# Patient Record
Sex: MALE | Race: WHITE | NOT HISPANIC OR LATINO | URBAN - METROPOLITAN AREA
[De-identification: names, ages, dates, MRNs, and addresses within clinical notes are randomized per-mention and may not be internally consistent; named-entity substitution may affect disease eponyms.]

---

## 2017-07-25 VITALS
SYSTOLIC BLOOD PRESSURE: 125 MMHG | OXYGEN SATURATION: 100 % | SYSTOLIC BLOOD PRESSURE: 94 MMHG | HEART RATE: 62 BPM | DIASTOLIC BLOOD PRESSURE: 63 MMHG | WEIGHT: 142 LBS | DIASTOLIC BLOOD PRESSURE: 87 MMHG | RESPIRATION RATE: 18 BRPM | SYSTOLIC BLOOD PRESSURE: 101 MMHG | OXYGEN SATURATION: 97 % | RESPIRATION RATE: 15 BRPM | SYSTOLIC BLOOD PRESSURE: 127 MMHG | DIASTOLIC BLOOD PRESSURE: 65 MMHG | RESPIRATION RATE: 12 BRPM | HEART RATE: 69 BPM | OXYGEN SATURATION: 96 % | HEART RATE: 61 BPM | RESPIRATION RATE: 17 BRPM | DIASTOLIC BLOOD PRESSURE: 59 MMHG | SYSTOLIC BLOOD PRESSURE: 138 MMHG | HEART RATE: 67 BPM | DIASTOLIC BLOOD PRESSURE: 69 MMHG | SYSTOLIC BLOOD PRESSURE: 104 MMHG | HEART RATE: 57 BPM | OXYGEN SATURATION: 98 % | TEMPERATURE: 97 F | DIASTOLIC BLOOD PRESSURE: 58 MMHG | RESPIRATION RATE: 16 BRPM | DIASTOLIC BLOOD PRESSURE: 53 MMHG | SYSTOLIC BLOOD PRESSURE: 122 MMHG | OXYGEN SATURATION: 94 % | DIASTOLIC BLOOD PRESSURE: 77 MMHG | OXYGEN SATURATION: 99 % | HEART RATE: 59 BPM | RESPIRATION RATE: 10 BRPM | SYSTOLIC BLOOD PRESSURE: 86 MMHG | DIASTOLIC BLOOD PRESSURE: 81 MMHG | SYSTOLIC BLOOD PRESSURE: 105 MMHG | HEART RATE: 42 BPM | SYSTOLIC BLOOD PRESSURE: 97 MMHG | RESPIRATION RATE: 22 BRPM | DIASTOLIC BLOOD PRESSURE: 67 MMHG | TEMPERATURE: 96.9 F | HEIGHT: 70 IN | HEART RATE: 25 BPM | OXYGEN SATURATION: 93 %

## 2017-07-26 ENCOUNTER — OFFICE (AMBULATORY)
Dept: URBAN - METROPOLITAN AREA CLINIC 64 | Facility: CLINIC | Age: 47
End: 2017-07-26

## 2017-07-26 ENCOUNTER — AMBULATORY SURGICAL CENTER (AMBULATORY)
Dept: URBAN - METROPOLITAN AREA SURGERY 17 | Facility: SURGERY | Age: 47
End: 2017-07-26
Payer: COMMERCIAL

## 2017-07-26 DIAGNOSIS — D12.2 BENIGN NEOPLASM OF ASCENDING COLON: ICD-10-CM

## 2017-07-26 DIAGNOSIS — K64.8 OTHER HEMORRHOIDS: ICD-10-CM

## 2017-07-26 DIAGNOSIS — K57.30 DIVERTICULOSIS OF LARGE INTESTINE WITHOUT PERFORATION OR ABS: ICD-10-CM

## 2017-07-26 DIAGNOSIS — Z80.0 FAMILY HISTORY OF MALIGNANT NEOPLASM OF DIGESTIVE ORGANS: ICD-10-CM

## 2017-07-26 PROBLEM — Z12.11 SCREENING FOR COLONIC NEOPLASIA: Status: ACTIVE | Noted: 2017-07-26

## 2017-07-26 LAB
GI HISTOLOGY: A. UNSPECIFIED: (no result)
GI HISTOLOGY: PDF REPORT: (no result)

## 2017-07-26 PROCEDURE — 88305 TISSUE EXAM BY PATHOLOGIST: CPT | Performed by: INTERNAL MEDICINE

## 2017-07-26 PROCEDURE — 45380 COLONOSCOPY AND BIOPSY: CPT | Mod: 33 | Performed by: INTERNAL MEDICINE

## 2017-07-26 RX ADMIN — PROPOFOL 100 MG: 10 INJECTION, EMULSION INTRAVENOUS at 10:20

## 2017-07-26 RX ADMIN — PROPOFOL 50 MG: 10 INJECTION, EMULSION INTRAVENOUS at 10:31

## 2017-07-26 RX ADMIN — PROPOFOL 50 MG: 10 INJECTION, EMULSION INTRAVENOUS at 10:22

## 2017-07-26 RX ADMIN — PROPOFOL 50 MG: 10 INJECTION, EMULSION INTRAVENOUS at 10:24

## 2017-07-26 RX ADMIN — PROPOFOL 50 MG: 10 INJECTION, EMULSION INTRAVENOUS at 10:29

## 2017-07-26 RX ADMIN — LIDOCAINE HYDROCHLORIDE 25 MG: 10 INJECTION, SOLUTION EPIDURAL; INFILTRATION; INTRACAUDAL; PERINEURAL at 10:16

## 2017-07-26 RX ADMIN — PROPOFOL 50 MG: 10 INJECTION, EMULSION INTRAVENOUS at 10:27

## 2017-07-26 RX ADMIN — PROPOFOL 50 MG: 10 INJECTION, EMULSION INTRAVENOUS at 10:26

## 2022-10-20 ENCOUNTER — HOSPITAL ENCOUNTER (EMERGENCY)
Facility: HOSPITAL | Age: 52
Discharge: HOME | End: 2022-10-20
Attending: EMERGENCY MEDICINE | Admitting: EMERGENCY MEDICINE

## 2022-10-20 VITALS
HEART RATE: 70 BPM | OXYGEN SATURATION: 100 % | DIASTOLIC BLOOD PRESSURE: 78 MMHG | TEMPERATURE: 96.5 F | SYSTOLIC BLOOD PRESSURE: 129 MMHG | RESPIRATION RATE: 16 BRPM

## 2022-10-20 DIAGNOSIS — M79.602 LEFT ARM PAIN: Primary | ICD-10-CM

## 2022-10-20 DIAGNOSIS — E78.5 HYPERLIPIDEMIA, UNSPECIFIED HYPERLIPIDEMIA TYPE: ICD-10-CM

## 2022-10-20 PROCEDURE — 99282 EMERGENCY DEPT VISIT SF MDM: CPT

## 2022-10-21 NOTE — DISCHARGE INSTRUCTIONS
Home, rest, alternate ice and heat to affected area, keep arm elevated, Tylenol as needed for pain.  Home medicine as prescribed, follow up with PCP for recheck. Return to care with further concerns.

## 2022-10-21 NOTE — ED PROVIDER NOTES
EMERGENCY DEPARTMENT ENCOUNTER    Room Number:  B01/01  Date of encounter:  10/20/2022  PCP: Enma Nguyen MD  Historian: Patient      HPI:  Chief Complaint: Left arm pain  A complete HPI/ROS/PMH/PSH/SH/FH are unobtainable due to: N/A    Context: Андрей Hernandez is a 52 y.o. RHD male with past medical history of hyperlipidemia who presents to the ED c/o left arm pain.  Patient states that he had an outpatient CT scan with IV contrast that extravasated in the left arm.  Patient is complaining of pain and swelling in the arm and was concerned about it.  Patient states that he has been applying heat and elevating the arm.  Denies any chest pain, shortness of breath, fever, chills, numbness, tingling or weakness of the arm.      PAST MEDICAL HISTORY  Active Ambulatory Problems     Diagnosis Date Noted   • No Active Ambulatory Problems     Resolved Ambulatory Problems     Diagnosis Date Noted   • No Resolved Ambulatory Problems     No Additional Past Medical History         PAST SURGICAL HISTORY  No past surgical history on file.      FAMILY HISTORY  No family history on file.      SOCIAL HISTORY  Social History     Socioeconomic History   • Marital status:          ALLERGIES  Sulfa antibiotics        REVIEW OF SYSTEMS  Review of Systems     All systems reviewed and negative except for those discussed in HPI.       PHYSICAL EXAM    I have reviewed the triage vital signs and nursing notes.    ED Triage Vitals [10/20/22 2021]   Temp Heart Rate Resp BP SpO2   96.5 °F (35.8 °C) 74 16 -- 99 %      Temp src Heart Rate Source Patient Position BP Location FiO2 (%)   -- Monitor -- -- --       Physical Exam  GENERAL: Alert and oriented x3, not distressed  HENT: nares patent  EYES: no scleral icterus  CV: regular rhythm, regular rate, intact radial pulse LUE  RESPIRATORY: normal effort, clear to auscultate bilaterally  MUSCULOSKELETAL: no deformity, normal ROM, mild soft tissue tenderness to the left upper extremity,  compartments soft  NEURO: alert, moves all extremities, normal sensation, follows commands  SKIN: warm, dry, mild swelling of the left arm, bruising at the left AC from prior IV, no palpable cords        LAB RESULTS  No results found for this or any previous visit (from the past 24 hour(s)).    Ordered the above labs and independently reviewed the results.        RADIOLOGY  No Radiology Exams Resulted Within Past 24 Hours    I ordered the above noted radiological studies. Reviewed by me and discussed with radiologist.  See dictation for official radiology interpretation.      PROCEDURES    Procedures      MEDICATIONS GIVEN IN ER    Medications - No data to display      PROGRESS, DATA ANALYSIS, CONSULTS, AND MEDICAL DECISION MAKING    All labs have been independently reviewed by me.  All radiology studies have been reviewed by me and discussed with radiologist dictating the report.   EKG's independently viewed and interpreted by me.  Discussion below represents my analysis of pertinent findings related to patient's condition, differential diagnosis, treatment plan and final disposition.    DDx: Includes but not limited to IV contrast extravasation, left arm pain, DVT, SVT         MDM: Patient's compartments are soft and has normal range of motion and sensation is left upper extremity, has been advised to apply ice and heat alternating, keep the arm elevated and take NSAIDs as needed for pain.    PPE: The patient wore a surgical mask throughout the entire patient encounter. I wore an N95.    AS OF 21:51 EDT VITALS:    BP - 129/78  HR - 70  TEMP - 96.5 °F (35.8 °C)  O2 SATS - 100%        DIAGNOSIS  Final diagnoses:   Left arm pain   Hyperlipidemia, unspecified hyperlipidemia type         DISPOSITION  DISCHARGE    Patient discharged in stable condition.    Reviewed implications of results, diagnosis, meds, responsibility to follow up, warning signs and symptoms of possible worsening, potential complications and reasons  to return to ER.    Patient/Family voiced understanding of above instructions.    Discussed plan for discharge, as there is no emergent indication for admission. Patient referred to primary care provider for BP management due to today's BP. Pt/family is agreeable and understands need for follow up and repeat testing.  Pt is aware that discharge does not mean that nothing is wrong but it indicates no emergency is present that requires admission and they must continue care with follow-up as given below or physician of their choice.     FOLLOW-UP  Enma Nguyen MD  [address] [address]  [phone]    Schedule an appointment as soon as possible for a visit            Medication List      No changes were made to your prescriptions during this visit.           Note Disclaimer: At [facility], we believe that sharing information builds trust and better relationships. You are receiving this note because you recently visited [facility]. It is possible you will see health information before a provider has talked with you about it. This kind of information can be easy to misunderstand. To help you fully understand what it means for your health, we urge you to discuss this note with your provider.       Jairo Spicer PA  10/20/22 6914

## 2022-10-21 NOTE — ED TRIAGE NOTES
pt arrived via PV c/o LUE swelling . Pt had CT w/ contrast done at outside facility around 1430 today. Pt reports his IV infiltrated and Contrast got into his skin. LUE is swollen and skin is tight . Pt denies pain . No redness is noted except for site where IV was placed earlier today

## 2022-10-21 NOTE — ED PROVIDER NOTES
Pt presents to the ED c/o  left arm pain and swelling after he had a CT scan with IV contrast this afternoon with some extravasation.  Has been using ice and heat but had some residual pressure in the antecubital area.  Denies any numbness or weakness or tingling currently, no cold sensation,     On exam,   General: No acute distress, nontoxic  HEENT: EOMI  Pulm: Symmetric chest rise, nonlabored breathing  CV: Regular rate and rhythm  GI: Nondistended  MSK: No deformity, minimal edema in the antecubital area without cellulitis, full range of motion, neurovascular intact distally with soft compartments  Skin: Warm, dry  Neuro: Awake, alert, oriented x 4, moving all extremities, no focal deficits  Psych: Calm, cooperative    Vital signs and nursing notes reviewed.         N95, protective eye goggles, and gloves used during this encounter. Patient in surgical mask.      Plan: Supportive care measures discussed, ED return for worsening symptoms discussed.  Outpatient follow-up as needed.  All questions and concerns addressed.  Safe for discharge.       Attestation:  The DARYN and I have discussed this patient's history, physical exam, and treatment plan.  I have reviewed the documentation and personally had a face to face interaction with the patient. I affirm the documentation and agree with the treatment and plan.  The attached note describes my personal findings.            Alex Brooks MD  10/20/22 0039

## 2022-10-29 ENCOUNTER — HOSPITAL ENCOUNTER (EMERGENCY)
Facility: HOSPITAL | Age: 52
Discharge: HOME | End: 2022-10-29
Attending: EMERGENCY MEDICINE | Admitting: EMERGENCY MEDICINE

## 2022-10-29 VITALS
OXYGEN SATURATION: 99 % | RESPIRATION RATE: 16 BRPM | DIASTOLIC BLOOD PRESSURE: 80 MMHG | SYSTOLIC BLOOD PRESSURE: 126 MMHG | HEART RATE: 64 BPM | TEMPERATURE: 97.3 F

## 2022-10-29 DIAGNOSIS — R21 RASH IN ADULT: Primary | ICD-10-CM

## 2022-10-29 PROCEDURE — 63710000001 PREDNISONE PER 1 MG: Performed by: EMERGENCY MEDICINE

## 2022-10-29 PROCEDURE — 99283 EMERGENCY DEPT VISIT LOW MDM: CPT

## 2022-10-29 RX ORDER — PREDNISONE 20 MG/1
20 TABLET ORAL DAILY
Qty: 3 TABLET | Refills: 0 | Status: SHIPPED | OUTPATIENT
Start: 2022-10-29 | End: 2022-11-01

## 2022-10-29 RX ORDER — PREDNISONE 20 MG/1
60 TABLET ORAL ONCE
Status: COMPLETED | OUTPATIENT
Start: 2022-10-29 | End: 2022-10-29

## 2022-10-29 RX ADMIN — PREDNISONE 60 MG: 20 TABLET ORAL at 17:15

## 2022-11-14 VITALS
RESPIRATION RATE: 15 BRPM | RESPIRATION RATE: 14 BRPM | RESPIRATION RATE: 1 BRPM | OXYGEN SATURATION: 99 % | OXYGEN SATURATION: 98 % | SYSTOLIC BLOOD PRESSURE: 100 MMHG | HEART RATE: 72 BPM | WEIGHT: 135 LBS | SYSTOLIC BLOOD PRESSURE: 134 MMHG | HEIGHT: 70 IN | DIASTOLIC BLOOD PRESSURE: 54 MMHG | HEART RATE: 77 BPM | HEART RATE: 73 BPM | RESPIRATION RATE: 16 BRPM | DIASTOLIC BLOOD PRESSURE: 86 MMHG | RESPIRATION RATE: 12 BRPM | DIASTOLIC BLOOD PRESSURE: 48 MMHG | SYSTOLIC BLOOD PRESSURE: 136 MMHG | SYSTOLIC BLOOD PRESSURE: 110 MMHG | SYSTOLIC BLOOD PRESSURE: 101 MMHG | HEART RATE: 81 BPM | SYSTOLIC BLOOD PRESSURE: 92 MMHG | RESPIRATION RATE: 18 BRPM | HEART RATE: 83 BPM | OXYGEN SATURATION: 92 % | OXYGEN SATURATION: 94 % | HEART RATE: 71 BPM | RESPIRATION RATE: 10 BRPM | OXYGEN SATURATION: 96 % | DIASTOLIC BLOOD PRESSURE: 44 MMHG | DIASTOLIC BLOOD PRESSURE: 64 MMHG | DIASTOLIC BLOOD PRESSURE: 56 MMHG | DIASTOLIC BLOOD PRESSURE: 33 MMHG | RESPIRATION RATE: 7 BRPM | OXYGEN SATURATION: 100 % | HEART RATE: 70 BPM | SYSTOLIC BLOOD PRESSURE: 94 MMHG | TEMPERATURE: 97.3 F | DIASTOLIC BLOOD PRESSURE: 45 MMHG | SYSTOLIC BLOOD PRESSURE: 85 MMHG | DIASTOLIC BLOOD PRESSURE: 96 MMHG | DIASTOLIC BLOOD PRESSURE: 50 MMHG | SYSTOLIC BLOOD PRESSURE: 95 MMHG | HEART RATE: 75 BPM | HEART RATE: 82 BPM | TEMPERATURE: 96 F | SYSTOLIC BLOOD PRESSURE: 102 MMHG | SYSTOLIC BLOOD PRESSURE: 96 MMHG | RESPIRATION RATE: 8 BRPM | RESPIRATION RATE: 13 BRPM

## 2022-11-22 ENCOUNTER — AMBULATORY SURGICAL CENTER (AMBULATORY)
Dept: URBAN - METROPOLITAN AREA SURGERY 17 | Facility: SURGERY | Age: 52
End: 2022-11-22
Payer: COMMERCIAL

## 2022-11-22 DIAGNOSIS — Z80.0 FAMILY HISTORY OF MALIGNANT NEOPLASM OF DIGESTIVE ORGANS: ICD-10-CM

## 2022-11-22 DIAGNOSIS — K64.8 OTHER HEMORRHOIDS: ICD-10-CM

## 2022-11-22 DIAGNOSIS — Z86.010 PERSONAL HISTORY OF COLONIC POLYPS: ICD-10-CM

## 2022-11-22 PROCEDURE — 45378 DIAGNOSTIC COLONOSCOPY: CPT | Mod: 33 | Performed by: INTERNAL MEDICINE

## 2023-01-09 PROCEDURE — 88184 FLOWCYTOMETRY/ TC 1 MARKER: CPT

## 2023-01-09 PROCEDURE — 88182 CELL MARKER STUDY: CPT

## 2023-01-09 PROCEDURE — 88185 FLOWCYTOMETRY/TC ADD-ON: CPT

## 2023-01-10 ENCOUNTER — PRIOR AUTHORIZATION (OUTPATIENT)
Dept: ONCOLOGY | Facility: CLINIC | Age: 53
End: 2023-01-10
Payer: COMMERCIAL

## 2023-01-10 ENCOUNTER — LAB (OUTPATIENT)
Dept: LAB | Facility: HOSPITAL | Age: 53
End: 2023-01-10
Payer: COMMERCIAL

## 2023-01-10 ENCOUNTER — CONSULT (OUTPATIENT)
Dept: ONCOLOGY | Facility: CLINIC | Age: 53
End: 2023-01-10
Payer: COMMERCIAL

## 2023-01-10 VITALS
SYSTOLIC BLOOD PRESSURE: 131 MMHG | OXYGEN SATURATION: 98 % | WEIGHT: 142.8 LBS | DIASTOLIC BLOOD PRESSURE: 88 MMHG | HEART RATE: 74 BPM | RESPIRATION RATE: 16 BRPM | BODY MASS INDEX: 19.99 KG/M2 | HEIGHT: 71 IN | TEMPERATURE: 98 F

## 2023-01-10 DIAGNOSIS — D75.1 ERYTHROCYTOSIS: ICD-10-CM

## 2023-01-10 DIAGNOSIS — I82.890 SUPERFICIAL VEIN THROMBOSIS: Primary | ICD-10-CM

## 2023-01-10 DIAGNOSIS — D72.818 OTHER DECREASED WHITE BLOOD CELL (WBC) COUNT: ICD-10-CM

## 2023-01-10 DIAGNOSIS — D58.2 ELEVATED HEMOGLOBIN: Primary | ICD-10-CM

## 2023-01-10 PROBLEM — D72.819 LEUKOPENIA: Status: ACTIVE | Noted: 2023-01-10

## 2023-01-10 LAB
ALBUMIN SERPL-MCNC: 4.8 G/DL (ref 3.5–5.2)
ALBUMIN/GLOB SERPL: 1.8 G/DL (ref 1.1–2.4)
ALP SERPL-CCNC: 67 U/L (ref 38–116)
ALT SERPL W P-5'-P-CCNC: 42 U/L (ref 0–41)
ANION GAP SERPL CALCULATED.3IONS-SCNC: 10.5 MMOL/L (ref 5–15)
AST SERPL-CCNC: 33 U/L (ref 0–40)
BASOPHILS # BLD AUTO: 0.04 10*3/MM3 (ref 0–0.2)
BASOPHILS NFR BLD AUTO: 0.8 % (ref 0–1.5)
BILIRUB SERPL-MCNC: 0.4 MG/DL (ref 0.2–1.2)
BUN SERPL-MCNC: 20 MG/DL (ref 6–20)
BUN/CREAT SERPL: 28.2 (ref 7.3–30)
CALCIUM SPEC-SCNC: 9.9 MG/DL (ref 8.5–10.2)
CHLORIDE SERPL-SCNC: 102 MMOL/L (ref 98–107)
CO2 SERPL-SCNC: 26.5 MMOL/L (ref 22–29)
CREAT SERPL-MCNC: 0.71 MG/DL (ref 0.7–1.3)
DEPRECATED RDW RBC AUTO: 39.8 FL (ref 37–54)
EGFRCR SERPLBLD CKD-EPI 2021: 109.7 ML/MIN/1.73
EOSINOPHIL # BLD AUTO: 0.08 10*3/MM3 (ref 0–0.4)
EOSINOPHIL NFR BLD AUTO: 1.7 % (ref 0.3–6.2)
ERYTHROCYTE [DISTWIDTH] IN BLOOD BY AUTOMATED COUNT: 11.9 % (ref 12.3–15.4)
FOLATE SERPL-MCNC: >20 NG/ML (ref 4.78–24.2)
GLOBULIN UR ELPH-MCNC: 2.6 GM/DL (ref 1.8–3.5)
GLUCOSE SERPL-MCNC: 102 MG/DL (ref 74–124)
HCT VFR BLD AUTO: 46.3 % (ref 37.5–51)
HGB BLD-MCNC: 16.5 G/DL (ref 13–17.7)
IMM GRANULOCYTES # BLD AUTO: 0.08 10*3/MM3 (ref 0–0.05)
IMM GRANULOCYTES NFR BLD AUTO: 1.7 % (ref 0–0.5)
LYMPHOCYTES # BLD AUTO: 1.49 10*3/MM3 (ref 0.7–3.1)
LYMPHOCYTES NFR BLD AUTO: 30.8 % (ref 19.6–45.3)
MCH RBC QN AUTO: 32.5 PG (ref 26.6–33)
MCHC RBC AUTO-ENTMCNC: 35.6 G/DL (ref 31.5–35.7)
MCV RBC AUTO: 91.3 FL (ref 79–97)
MONOCYTES # BLD AUTO: 0.51 10*3/MM3 (ref 0.1–0.9)
MONOCYTES NFR BLD AUTO: 10.5 % (ref 5–12)
NEUTROPHILS NFR BLD AUTO: 2.64 10*3/MM3 (ref 1.7–7)
NEUTROPHILS NFR BLD AUTO: 54.5 % (ref 42.7–76)
NRBC BLD AUTO-RTO: 0.6 /100 WBC (ref 0–0.2)
PLATELET # BLD AUTO: 169 10*3/MM3 (ref 140–450)
PMV BLD AUTO: 10.8 FL (ref 6–12)
POTASSIUM SERPL-SCNC: 4.5 MMOL/L (ref 3.5–4.7)
PROT SERPL-MCNC: 7.4 G/DL (ref 6.3–8)
RBC # BLD AUTO: 5.07 10*6/MM3 (ref 4.14–5.8)
SODIUM SERPL-SCNC: 139 MMOL/L (ref 134–145)
VIT B12 BLD-MCNC: 1192 PG/ML (ref 211–946)
WBC NRBC COR # BLD: 4.84 10*3/MM3 (ref 3.4–10.8)

## 2023-01-10 PROCEDURE — 80053 COMPREHEN METABOLIC PANEL: CPT | Performed by: INTERNAL MEDICINE

## 2023-01-10 PROCEDURE — 85025 COMPLETE CBC W/AUTO DIFF WBC: CPT

## 2023-01-10 PROCEDURE — 82746 ASSAY OF FOLIC ACID SERUM: CPT | Performed by: INTERNAL MEDICINE

## 2023-01-10 PROCEDURE — 36415 COLL VENOUS BLD VENIPUNCTURE: CPT

## 2023-01-10 PROCEDURE — 82607 VITAMIN B-12: CPT | Performed by: INTERNAL MEDICINE

## 2023-01-10 PROCEDURE — 99204 OFFICE O/P NEW MOD 45 MIN: CPT | Performed by: INTERNAL MEDICINE

## 2023-01-10 RX ORDER — CLOTRIMAZOLE AND BETAMETHASONE DIPROPIONATE 10; .64 MG/G; MG/G
CREAM TOPICAL
COMMUNITY
Start: 2022-12-28

## 2023-01-10 RX ORDER — ROSUVASTATIN CALCIUM 10 MG/1
10 TABLET, COATED ORAL DAILY
COMMUNITY

## 2023-01-10 RX ORDER — MOMETASONE FUROATE 50 UG/1
2 SPRAY, METERED NASAL DAILY
COMMUNITY

## 2023-01-10 RX ORDER — EPINEPHRINE 0.3 MG/.3ML
INJECTION SUBCUTANEOUS
COMMUNITY
Start: 2022-05-09

## 2023-01-10 RX ORDER — FEXOFENADINE HCL 180 MG/1
180 TABLET ORAL DAILY
COMMUNITY

## 2023-01-10 NOTE — PROGRESS NOTES
Chief Complaint  Chronic borderline leukopenia, superficial abdominal wall thrombosis, borderline erythrocytosis    Subjective    Patient was referred by Dr. Enma Nguyen for hematology consultation regarding the above issues    History of Present Illness    The patient is a 52-year-old gentleman with past medical history significant for hyperlipidemia, hyperglycemia, colon polyps here for initial hematology consultation regarding the above issues.    Patient has history of colonic polyps, last underwent colonoscopy in December 2022 with Dr. Aureliano Li, reports negative findings.  He does have a family history of colon polyps, no family history of colon cancer.    Patient notes that around September 2022 he had been lifting weights and noticed development of a palpable venous cord extending from the area of his left nipple down to around his umbilicus.  This was nontender.  He was seen by vascular surgery and underwent multiple imaging studies.  Ultrasound of the abdomen with hepatic Dopplers on 10/8/2022 was negative, showed hepatic vasculature patent.  CT abdomen pelvis 10/20/2022 at [facility] was negative, could not visualize any abnormality where the palpable left abdominal wall cord was present.  A CT angiogram of the abdomen and pelvis on 11/16/2022 performed at [facility] was negative.  Ultimately it was felt by vascular surgery that the patient had a thrombosed superficial vein in the abdominal wall.  This gradually improved over time.  He has not received any anticoagulation.  Patient was seen by his primary care physician Dr. Nguyen on 1/3/2023 and orders have been placed for laboratory studies with antiphospholipid panel, factor V Leiden mutation, prothrombin gene mutation, Antithrombin III, protein C activity, protein S activity.  Labs are to be drawn this week.  Patient notes currently that the palpable cord is only vaguely identifiable at this point.  He has had no other personal nor  any family history of thrombosis.    In reviewing the patient's labs, he has had WBC that has been in the 3-5000 range.  Labs date back to 11/15/2018 at which time he had a WBC of 5.7 with differential 55 segs, 32 lymphs, 10 monocytes.  WBC on 2/18/2020 was 4.7, on 9/15/2021 had declined to 3.7 (again with normal differential).  On 5/6/2022, WBC was 3.4 with normal differential.  Most recent labs on 12/29/2022 with WBC 3.5 and differential 47 segs, 40 lymphs, 9 monocytes, 2 eosinophils.    The patient has maintained a hematocrit in the 46-48 range.  On 12/29/2022, he did have a hemoglobin of 17.3 (above normal range) and hematocrit normal at 49.5.    Today, the patient reports that he has done relatively well recently.  He does have some mild pain involving his left shoulder with certain movements, feels that he likely has injured his rotator cuff.        Objective   Vital Signs:   /88   Pulse 74   Temp 98 °F (36.7 °C) (Temporal)   Resp 16   Ht 181 cm (71.26\") Comment: NEW HT  Wt 64.8 kg (142 lb 12.8 oz)   SpO2 98%   BMI 19.77 kg/m²     Physical Exam  Constitutional:       Appearance: He is well-developed.   Eyes:      Conjunctiva/sclera: Conjunctivae normal.   Neck:      Thyroid: No thyromegaly.   Cardiovascular:      Rate and Rhythm: Normal rate and regular rhythm.      Heart sounds: No murmur heard.    No friction rub. No gallop.   Pulmonary:      Effort: No respiratory distress.      Breath sounds: Normal breath sounds.   Abdominal:      General: Bowel sounds are normal. There is no distension.      Palpations: Abdomen is soft.      Tenderness: There is no abdominal tenderness.      Comments: There is a vague \"cord\" in the subcutaneous tissues just beneath the left pectoral/breast area extending into the upper abdomen with no visible abnormality.  Diameter is less than 0.5 cm.   Lymphadenopathy:      Head:      Right side of head: No submandibular adenopathy.      Cervical: No cervical adenopathy.       Upper Body:      Right upper body: No supraclavicular adenopathy.      Left upper body: No supraclavicular adenopathy.   Skin:     General: Skin is warm and dry.      Findings: No rash.   Neurological:      Mental Status: He is alert and oriented to person, place, and time.      Cranial Nerves: No cranial nerve deficit.      Motor: No abnormal muscle tone.      Deep Tendon Reflexes: Reflexes normal.   Psychiatric:         Behavior: Behavior normal.        Result Review : CBC from today.  Reviewed multiple outside laboratory and radiographic records including progress notes as outlined above and below.       Assessment and Plan     *Superficial abdominal wall venous thrombosis  · Patient with no prior personal nor family history of thrombosis  · In September 2022 patient noticed development of a palpable venous cord extending from the area of his left nipple down to around his umbilicus, nontender.    · He was seen by vascular surgery and underwent multiple imaging studies.    · Ultrasound of the abdomen with hepatic Dopplers on 10/8/2022 was negative, showed hepatic vasculature patent.    · CT abdomen pelvis 10/20/2022 at [facility] was negative, could not visualize any abnormality where the palpable left abdominal wall cord was present.    · CT angiogram of the abdomen and pelvis on 11/16/2022 performed at [facility] was negative.    · Ultimately it was felt by vascular surgery that the patient had a thrombosed superficial vein in the abdominal wall.    · This gradually improved over time.  He has not received any anticoagulation.    · Patient was seen by his primary care physician Dr. Nguyen on 1/3/2023 and orders have been placed for laboratory studies with antiphospholipid panel, factor V Leiden mutation, prothrombin gene mutation, Antithrombin III, protein C activity, protein S activity.  Labs are to be drawn this week.    · Today on exam there is a vaguely palpable very small cord in the  subcutaneous tissue just beneath the left pectoral muscle/breast extending into the upper abdomen.  As above patient notes that this has improved over time and is nontender.  Presumably this represents a superficial venous thrombosis in the abdominal wall although it has not been evident on radiographic imaging as noted above.  It appears to be resolving spontaneously and therefore no treatment is needed.  We will await the hypercoagulable evaluation being sent by patient's primary care physician.    *Chronic borderline leukopenia  · Patient has had WBC that has been in the 3-5000 range.    · Labs date back to 11/15/2018 at which time he had a WBC of 5.7 with differential 55 segs, 32 lymphs, 10 monocytes.    · WBC on 2/18/2020 was 4.7, on 9/15/2021 had declined to 3.7 (again with normal differential).    · On 5/6/2022, WBC was 3.4 with normal differential.    · Most recent labs on 12/29/2022 with WBC 3.5 and differential 47 segs, 40 lymphs, 9 monocytes, 2 eosinophils.  · WBC today is 4.84 with normal differential.  · We will send off evaluation today with B12, folate, peripheral blood flow cytometry, CORRY panel.  Unclear whether this represents a chronic benign leukopenia or autoimmune leukopenia.  Recheck when patient returns in 2 months.    *Borderline erythrocytosis  · The patient has maintained a hematocrit in the 46-48 range.    · On 12/29/2022, he did have a hemoglobin of 17.3 (above normal range) and hematocrit normal at 49.5.  · Today, patient with normal hemoglobin 16.5 and hematocrit of 46.3.  · In any other situation I would not necessarily pursue any evaluation for this given the normal findings today.  With the borderline elevated findings on 12/29/2022 and with recent unusual site of thrombosis with abdominal wall superficial venous thrombus, we will go ahead and send off JAK2 V617F mutation with reflex to exon 12 and we will go ahead and send off the EPO level.  He has no symptoms to suggest underlying  obstructive sleep apnea.  Recheck hematocrit in 2 months with return visit.    Plan:  1. Additional labs today with CMP, peripheral blood flow cytometry, CORRY panel, B12, folate, erythropoietin level, JAK2 V617F mutation with reflex to exon 12 mutation.  2. The patient is having labs drawn by Dr. Nguyen this week for hypercoagulable evaluation with antiphospholipid panel, factor V Leiden mutation, prothrombin gene mutation, Antithrombin III, protein C activity, protein S activity  3. There is no need for consideration of anticoagulation given the superficial, asymptomatic, and improving nature of the abdominal wall thrombosis  4. Return visit in 2 months with CBC      I did spend 45 minutes in total time caring for the patient today, time spent in review of records, face-to-face consultation, coordination of care, placement of orders, completion of documentation.

## 2023-01-10 NOTE — LETTER
January 10, 2023     Enma Nguyen MD  [address]  [address]  [place] [id number]    Patient: Андрей Hernandez   YOB: 1970   Date of Visit: 1/10/2023       Dear Dr. Patrick MD:    Thank you for referring Андрей Hernandez to me for evaluation. Below are the relevant portions of my assessment and plan of care.    If you have questions, please do not hesitate to call me. I look forward to following Андрей along with you.         Sincerely,        Jario Guerra MD        CC: No Recipients  Jairo Guerra Jr., MD  01/10/23 2136  Sign when Signing Visit  Chief Complaint  Chronic borderline leukopenia, superficial abdominal wall thrombosis, borderline erythrocytosis    Subjective     Patient was referred by Dr. Enma Nguyen for hematology consultation regarding the above issues    History of Present Illness    The patient is a 52-year-old gentleman with past medical history significant for hyperlipidemia, hyperglycemia, colon polyps here for initial hematology consultation regarding the above issues.    Patient has history of colonic polyps, last underwent colonoscopy in December 2022 with Dr. Aureliano Li, reports negative findings.  He does have a family history of colon polyps, no family history of colon cancer.    Patient notes that around September 2022 he had been lifting weights and noticed development of a palpable venous cord extending from the area of his left nipple down to around his umbilicus.  This was nontender.  He was seen by vascular surgery and underwent multiple imaging studies.  Ultrasound of the abdomen with hepatic Dopplers on 10/8/2022 was negative, showed hepatic vasculature patent.  CT abdomen pelvis 10/20/2022 at [facility] was negative, could not visualize any abnormality where the palpable left abdominal wall cord was present.  A CT angiogram of the abdomen and pelvis on 11/16/2022 performed at [facility] was negative.  Ultimately it was felt by vascular surgery  that the patient had a thrombosed superficial vein in the abdominal wall.  This gradually improved over time.  He has not received any anticoagulation.  Patient was seen by his primary care physician Dr. Nguyen on 1/3/2023 and orders have been placed for laboratory studies with antiphospholipid panel, factor V Leiden mutation, prothrombin gene mutation, Antithrombin III, protein C activity, protein S activity.  Labs are to be drawn this week.  Patient notes currently that the palpable cord is only vaguely identifiable at this point.  He has had no other personal nor any family history of thrombosis.    In reviewing the patient's labs, he has had WBC that has been in the 3-5000 range.  Labs date back to 11/15/2018 at which time he had a WBC of 5.7 with differential 55 segs, 32 lymphs, 10 monocytes.  WBC on 2/18/2020 was 4.7, on 9/15/2021 had declined to 3.7 (again with normal differential).  On 5/6/2022, WBC was 3.4 with normal differential.  Most recent labs on 12/29/2022 with WBC 3.5 and differential 47 segs, 40 lymphs, 9 monocytes, 2 eosinophils.    The patient has maintained a hematocrit in the 46-48 range.  On 12/29/2022, he did have a hemoglobin of 17.3 (above normal range) and hematocrit normal at 49.5.    Today, the patient reports that he has done relatively well recently.  He does have some mild pain involving his left shoulder with certain movements, feels that he likely has injured his rotator cuff.        Objective    Vital Signs:   /88   Pulse 74   Temp 98 °F (36.7 °C) (Temporal)   Resp 16   Ht 181 cm (71.26\") Comment: NEW HT  Wt 64.8 kg (142 lb 12.8 oz)   SpO2 98%   BMI 19.77 kg/m²     Physical Exam  Constitutional:       Appearance: He is well-developed.   Eyes:      Conjunctiva/sclera: Conjunctivae normal.   Neck:      Thyroid: No thyromegaly.   Cardiovascular:      Rate and Rhythm: Normal rate and regular rhythm.      Heart sounds: No murmur heard.    No friction rub. No gallop.    Pulmonary:      Effort: No respiratory distress.      Breath sounds: Normal breath sounds.   Abdominal:      General: Bowel sounds are normal. There is no distension.      Palpations: Abdomen is soft.      Tenderness: There is no abdominal tenderness.      Comments: There is a vague \"cord\" in the subcutaneous tissues just beneath the left pectoral/breast area extending into the upper abdomen with no visible abnormality.  Diameter is less than 0.5 cm.   Lymphadenopathy:      Head:      Right side of head: No submandibular adenopathy.      Cervical: No cervical adenopathy.      Upper Body:      Right upper body: No supraclavicular adenopathy.      Left upper body: No supraclavicular adenopathy.   Skin:     General: Skin is warm and dry.      Findings: No rash.   Neurological:      Mental Status: He is alert and oriented to person, place, and time.      Cranial Nerves: No cranial nerve deficit.      Motor: No abnormal muscle tone.      Deep Tendon Reflexes: Reflexes normal.   Psychiatric:         Behavior: Behavior normal.        Result Review : CBC from today.  Reviewed multiple outside laboratory and radiographic records including progress notes as outlined above and below.      Assessment and Plan     *Superficial abdominal wall venous thrombosis  · Patient with no prior personal nor family history of thrombosis  · In September 2022 patient noticed development of a palpable venous cord extending from the area of his left nipple down to around his umbilicus, nontender.    · He was seen by vascular surgery and underwent multiple imaging studies.    · Ultrasound of the abdomen with hepatic Dopplers on 10/8/2022 was negative, showed hepatic vasculature patent.    · CT abdomen pelvis 10/20/2022 at [facility] imaging was negative, could not visualize any abnormality where the palpable left abdominal wall cord was present.    · CT angiogram of the abdomen and pelvis on 11/16/2022 performed at [facility] was negative.     · Ultimately it was felt by vascular surgery that the patient had a thrombosed superficial vein in the abdominal wall.    · This gradually improved over time.  He has not received any anticoagulation.    · Patient was seen by his primary care physician Dr. Nguyen on 1/3/2023 and orders have been placed for laboratory studies with antiphospholipid panel, factor V Leiden mutation, prothrombin gene mutation, Antithrombin III, protein C activity, protein S activity.  Labs are to be drawn this week.    · Today on exam there is a vaguely palpable very small cord in the subcutaneous tissue just beneath the left pectoral muscle/breast extending into the upper abdomen.  As above patient notes that this has improved over time and is nontender.  Presumably this represents a superficial venous thrombosis in the abdominal wall although it has not been evident on radiographic imaging as noted above.  It appears to be resolving spontaneously and therefore no treatment is needed.  We will await the hypercoagulable evaluation being sent by patient's primary care physician.    *Chronic borderline leukopenia  · Patient has had WBC that has been in the 3-5000 range.    · Labs date back to 11/15/2018 at which time he had a WBC of 5.7 with differential 55 segs, 32 lymphs, 10 monocytes.    · WBC on 2/18/2020 was 4.7, on 9/15/2021 had declined to 3.7 (again with normal differential).    · On 5/6/2022, WBC was 3.4 with normal differential.    · Most recent labs on 12/29/2022 with WBC 3.5 and differential 47 segs, 40 lymphs, 9 monocytes, 2 eosinophils.  · WBC today is 4.84 with normal differential.  · We will send off evaluation today with B12, folate, peripheral blood flow cytometry, CORRY panel.  Unclear whether this represents a chronic benign leukopenia or autoimmune leukopenia.  Recheck when patient returns in 2 months.    *Borderline erythrocytosis  · The patient has maintained a hematocrit in the 46-48 range.    · On 12/29/2022,  he did have a hemoglobin of 17.3 (above normal range) and hematocrit normal at 49.5.  · Today, patient with normal hemoglobin 16.5 and hematocrit of 46.3.  · In any other situation I would not necessarily pursue any evaluation for this given the normal findings today.  With the borderline elevated findings on 12/29/2022 and with recent unusual site of thrombosis with abdominal wall superficial venous thrombus, we will go ahead and send off JAK2 V617F mutation with reflex to exon 12 and we will go ahead and send off the EPO level.  He has no symptoms to suggest underlying obstructive sleep apnea.  Recheck hematocrit in 2 months with return visit.    Plan:  1. Additional labs today with CMP, peripheral blood flow cytometry, CORRY panel, B12, folate, erythropoietin level, JAK2 V617F mutation with reflex to exon 12 mutation.  2. The patient is having labs drawn by Dr. Nguyen this week for hypercoagulable evaluation with antiphospholipid panel, factor V Leiden mutation, prothrombin gene mutation, Antithrombin III, protein C activity, protein S activity  3. There is no need for consideration of anticoagulation given the superficial, asymptomatic, and improving nature of the abdominal wall thrombosis  4. Return visit in 2 months with CBC      I did spend 45 minutes in total time caring for the patient today, time spent in review of records, face-to-face consultation, coordination of care, placement of orders, completion of documentation.

## 2023-01-10 NOTE — TELEPHONE ENCOUNTER
No PA needed for Flow 05809, 53520, 29482 and Jak2 26186, 21684 per [facility]. Ref # [id number]. Ok'd lab to send to [facility].

## 2023-01-11 ENCOUNTER — PATIENT ROUNDING (BHMG ONLY) (OUTPATIENT)
Dept: ONCOLOGY | Facility: CLINIC | Age: 53
End: 2023-01-11
Payer: COMMERCIAL

## 2023-01-11 LAB
CENTROMERE B AB SER-ACNC: <0.2 AI (ref 0–0.9)
CHROMATIN AB SERPL-ACNC: <0.2 AI (ref 0–0.9)
DSDNA AB SER-ACNC: <1 IU/ML (ref 0–9)
ENA JO1 AB SER-ACNC: <0.2 AI (ref 0–0.9)
ENA RNP AB SER-ACNC: 0.5 AI (ref 0–0.9)
ENA SCL70 AB SER-ACNC: <0.2 AI (ref 0–0.9)
ENA SM AB SER-ACNC: <0.2 AI (ref 0–0.9)
ENA SS-A AB SER-ACNC: <0.2 AI (ref 0–0.9)
ENA SS-B AB SER-ACNC: <0.2 AI (ref 0–0.9)
EPO SERPL-ACNC: 12.1 MIU/ML (ref 2.6–18.5)
Lab: NORMAL

## 2023-01-11 NOTE — PROGRESS NOTES
A My-Chart message has been sent to the patient for PATIENT ROUNDING with [facility].

## 2023-01-13 ENCOUNTER — TELEPHONE (OUTPATIENT)
Dept: ONCOLOGY | Facility: CLINIC | Age: 53
End: 2023-01-13
Payer: COMMERCIAL

## 2023-01-13 NOTE — TELEPHONE ENCOUNTER
"Returned call to patient with the information from Dr. Guerra as follows:  \"Please let him know that all of the lab work we have available to date has been negative.  We are still waiting on his Osvaldo 2 mutation which may be available by next week and we will let him know when this results.  For now, no changes to the plan we discussed at his visit.\"  He v/u.    "

## 2023-01-13 NOTE — TELEPHONE ENCOUNTER
Caller: Андрей Hernandez    Relationship: Self    Best call back number: [phone]    What is the best time to reach you: ANYTIME    Who are you requesting to speak with (clinical staff, provider,  specific staff member): DR YOUNGBLOOD OR NURSE    What was the call regarding: PLEASE CALL PT TO DISCUSS HIS 1/10 LAB RESULTS.     Do you require a callback: YES

## 2023-01-20 ENCOUNTER — TELEPHONE (OUTPATIENT)
Dept: ONCOLOGY | Facility: CLINIC | Age: 53
End: 2023-01-20
Payer: COMMERCIAL

## 2023-01-20 NOTE — TELEPHONE ENCOUNTER
Caller: Андрей Hernandez    Relationship: Self    Best call back number: [phone]    What is the best time to reach you: ANYTIME    Who are you requesting to speak with (clinical staff, provider,  specific staff member): DR YOUNGBLOOD OR NURSE    What was the call regarding: PLEASE CALL PT TO DISCUSS HIS 1/10 LAB RESULTS.     Do you require a callback: YES

## 2023-01-20 NOTE — TELEPHONE ENCOUNTER
Returned call to patient with the information that his labs are negative.  The flow cytometry and the Osvaldo 2 were negative.  Read him the other results.  He is to keep his appointment in March as scheduled.

## 2023-01-25 LAB — REF LAB TEST METHOD: NORMAL

## 2023-01-25 NOTE — TELEPHONE ENCOUNTER
Caller: Андрей Hernandez    Relationship: Self    Best call back number: [phone]    What is the best time to reach you: ANYTIME    Who are you requesting to speak with (clinical staff, provider,  specific staff member): DR YOUNGBLOOD OR DACIA HIGGINBOTHAM    What was the call regarding: PT HAD LABS DRAWN AGAIN ON 1/16 WITH [facility]. HE WANTED TO DISCUSS THE RESULTS WITH DR YOUNGBLOOD.     Do you require a callback: YES

## 2023-02-07 LAB — REF LAB TEST METHOD: NORMAL

## 2023-03-10 ENCOUNTER — TELEPHONE (OUTPATIENT)
Dept: ONCOLOGY | Facility: CLINIC | Age: 53
End: 2023-03-10
Payer: COMMERCIAL

## 2023-03-10 NOTE — TELEPHONE ENCOUNTER
Caller: Андрей Hernandez    Relationship: Self    Best call back number: [phone]    What was the call regarding: PATIENT WANTED TO KNOW IF HE CAN GET HIS LABS DONE AT [facility] IN THE SPRING, IF SO CAN YOU SEND ORDERS    Do you require a callback: YES

## 2023-03-13 ENCOUNTER — TELEPHONE (OUTPATIENT)
Dept: ONCOLOGY | Facility: CLINIC | Age: 53
End: 2023-03-13
Payer: COMMERCIAL

## 2023-03-13 NOTE — TELEPHONE ENCOUNTER
Returned call to patient and confirmed that lab orders had been faxed for today's appointment. Patient v/u.

## 2023-03-13 NOTE — TELEPHONE ENCOUNTER
Phoned [email or web address], spoke with Mario at corporate office. Unable to get fax with lab orders to go through to local [facility] location. Mario provided his direct fax number and stated he would email lab orders to local [email or web address] location for patient's lab appointment today.

## 2023-03-14 ENCOUNTER — OFFICE VISIT (OUTPATIENT)
Dept: ONCOLOGY | Facility: CLINIC | Age: 53
End: 2023-03-14
Payer: COMMERCIAL

## 2023-03-14 VITALS
SYSTOLIC BLOOD PRESSURE: 138 MMHG | WEIGHT: 144.7 LBS | TEMPERATURE: 97.3 F | BODY MASS INDEX: 20.26 KG/M2 | DIASTOLIC BLOOD PRESSURE: 88 MMHG | HEART RATE: 82 BPM | OXYGEN SATURATION: 95 % | HEIGHT: 71 IN | RESPIRATION RATE: 16 BRPM

## 2023-03-14 DIAGNOSIS — D75.1 ERYTHROCYTOSIS: Primary | ICD-10-CM

## 2023-03-14 PROCEDURE — 99214 OFFICE O/P EST MOD 30 MIN: CPT | Performed by: INTERNAL MEDICINE

## 2023-03-14 NOTE — PROGRESS NOTES
"Chief Complaint  Chronic borderline leukopenia, superficial abdominal wall thrombosis, borderline erythrocytosis    Subjective        History of Present Illness  Patient returns today in follow-up after his initial consultation with laboratory studies to review.  He has not experienced any recurrence of the abdominal wall superficial thrombosis.  He did undergo hypercoagulable evaluation drawn by his primary care physician on 1/16/2023 in the [facility] system which showed negative antiphospholipid panel, negative factor V Leiden mutation, normal protein C&S activity, normal Antithrombin III.  We did perform laboratory evaluation regarding his chronic borderline leukopenia and borderline erythrocytosis which we are reviewing today.  The patient has no other complaints currently.  He is not on any anticoagulation, does not take aspirin.          Objective   Vital Signs:   /88   Pulse 82   Temp 97.3 °F (36.3 °C) (Temporal)   Resp 16   Ht 181 cm (71.26\")   Wt 65.6 kg (144 lb 11.2 oz)   SpO2 95%   BMI 20.03 kg/m²     Physical Exam  Constitutional:       Appearance: He is well-developed.   Eyes:      Conjunctiva/sclera: Conjunctivae normal.   Neck:      Thyroid: No thyromegaly.   Cardiovascular:      Rate and Rhythm: Normal rate and regular rhythm.      Heart sounds: No murmur heard.    No friction rub. No gallop.   Pulmonary:      Effort: No respiratory distress.      Breath sounds: Normal breath sounds.   Abdominal:      General: Bowel sounds are normal. There is no distension.      Palpations: Abdomen is soft.      Tenderness: There is no abdominal tenderness.      Comments: The previous palpable \"cord\" in the subcutaneous tissue beneath the left pectoral/breast area extending into the upper abdomen has resolved completely with no residual abnormality.   Lymphadenopathy:      Head:      Right side of head: No submandibular adenopathy.      Cervical: No cervical adenopathy.      Upper Body: "      Right upper body: No supraclavicular adenopathy.      Left upper body: No supraclavicular adenopathy.   Skin:     General: Skin is warm and dry.      Findings: No rash.   Neurological:      Mental Status: He is alert and oriented to person, place, and time.      Cranial Nerves: No cranial nerve deficit.      Motor: No abnormal muscle tone.      Deep Tendon Reflexes: Reflexes normal.   Psychiatric:         Behavior: Behavior normal.        Result Review : CBC from 3/13/2023.  Reviewed labs from 1/10/2023 including CMP, peripheral blood flow cytometry, CORRY panel, B12 level, folate, erythropoietin level, JAK2 mutation analysis.  Reviewed labs from PCP 1/16/2023 including Antithrombin III, protein C&S activity, antiphospholipid panel, factor V Leiden mutation.       Assessment and Plan     *Superficial abdominal wall venous thrombosis  · Patient with no prior personal nor family history of thrombosis  · In September 2022 patient noticed development of a palpable venous cord extending from the area of his left nipple down to around his umbilicus, nontender.    · He was seen by vascular surgery and underwent multiple imaging studies.    · Ultrasound of the abdomen with hepatic Dopplers on 10/8/2022 was negative, showed hepatic vasculature patent.    · CT abdomen pelvis [facility] at [facility] was negative, could not visualize any abnormality where the palpable left abdominal wall cord was present.    · CT angiogram of the abdomen and pelvis on 11/16/2022 performed at [facility] was negative.    · Ultimately it was felt by vascular surgery that the patient had a thrombosed superficial vein in the abdominal wall.    · This gradually improved over time.  He has not received any anticoagulation.    · Patient was seen by his primary care physician Dr. Nguyen on 1/3/2023 and hypercoagulable evaluation was sent on 1/16/2023 with Antithrombin %, protein C activity 179%, protein S activity 100%, negative  anticardiolipin antibody panel, negative antibeta 2 GP 1 antibody panel, negative lupus anticoagulant, negative factor V Leiden mutation.  · At time of initial consultation on 1/10/2023 there was a resolving vaguely palpable very small cord in the subcutaneous tissue just beneath the left pectoral muscle/breast extending into the upper abdomen.    · Spontaneous improvement over time, asymptomatic status, superficial venous nature of the thrombosis thrombosis in the abdominal wall which was not evident on radiographic imaging as noted above.  No treatment was recommended at that point  · There has been complete resolution of the previous abnormality in the abdominal wall on exam.  There does not appear to be any indication for anticoagulation in this situation.    *Chronic borderline leukopenia  · Patient has had WBC that has been in the 3-5000 range.    · Labs date back to 11/15/2018 at which time he had a WBC of 5.7 with differential 55 segs, 32 lymphs, 10 monocytes.    · WBC on 2/18/2020 was 4.7, on 9/15/2021 had declined to 3.7 (again with normal differential).    · On 5/6/2022, WBC was 3.4 with normal differential.    · Most recent labs on 12/29/2022 with WBC 3.5 and differential 47 segs, 40 lymphs, 9 monocytes, 2 eosinophils.  · At time of initial consultation 1/10/2023 WBC 4.84 with normal differential.  · Additional evaluation on 1/10/2023 with negative peripheral blood flow cytometry, B12 1192, folate greater than 20  · Today, patient returns with labs from 3/13/2023 with WBC low normal at 4.2 with differential 46 segs, 37 lymphs, 13 monocytes.  Will not pursue any further evaluation at this point with persistent low normal WBC.  We will recheck counts in 6 months.    *Borderline erythrocytosis  · The patient has maintained a hematocrit in the 46-48 range.    · On 12/29/2022, he did have a hemoglobin of 17.3 (above normal range) and hematocrit normal at 49.5.  · At time of initial consultation 1/10/2023,  normal hemoglobin 16.5 and hematocrit of 46.3.  · Given the borderline elevated hematocrit on 12/29/2022 and with recent unusual site of thrombosis with abdominal wall superficial venous thrombus, elected to send off evaluation on 1/10/2023.  Erythropoietin level normal at 12.1, JAK2 V617F and exon 12 mutation negative.    · He has no symptoms to suggest underlying obstructive sleep apnea.    · Patient returns today with labs from 3/13/2023 with hemoglobin 16.8, hematocrit normal at 48.  We will not pursue any further evaluation at this time.  He will return in 6 months and we will recheck a CBC at that time.    Plan:    1. There is no need for consideration of anticoagulation given the superficial, asymptomatic nature of the previous thrombosis with spontaneous resolution  2. No further evaluation for now regarding previous borderline leukopenia and borderline erythrocytosis with normal WBC and hematocrit currently  3. Return in 6 months with repeat CBC.

## 2023-09-11 NOTE — PROGRESS NOTES
"Chief Complaint  Chronic borderline leukopenia, superficial abdominal wall thrombosis, borderline erythrocytosis    Subjective        History of Present Illness    Patient returns today for 6-month interval follow-up visit.  He did have a CBC performed however this was performed at [facility] and result is not currently available today, will will hopefully be available by tomorrow.  The patient has no complaints since he was last here.  He has had no recurrence of the superficial thrombosis that occurred in his abdominal wall.  He notes that he has now taken on new position at his work and this is much better in terms of his sleep.        Objective   Vital Signs:   /87   Pulse 71   Temp 98.6 °F (37 °C) (Temporal)   Resp 18   Ht 180.3 cm (70.98\")   Wt 67.3 kg (148 lb 4.8 oz)   SpO2 99%   BMI 20.69 kg/m²     Physical Exam  Constitutional:       Appearance: He is well-developed.   Eyes:      Conjunctiva/sclera: Conjunctivae normal.   Neck:      Thyroid: No thyromegaly.   Cardiovascular:      Rate and Rhythm: Normal rate and regular rhythm.      Heart sounds: No murmur heard.    No friction rub. No gallop.   Pulmonary:      Effort: No respiratory distress.      Breath sounds: Normal breath sounds.   Abdominal:      General: Bowel sounds are normal. There is no distension.      Palpations: Abdomen is soft.      Tenderness: There is no abdominal tenderness.      Comments: No palpable abnormalities in the abdominal wall.   Lymphadenopathy:      Head:      Right side of head: No submandibular adenopathy.      Cervical: No cervical adenopathy.      Upper Body:      Right upper body: No supraclavicular adenopathy.      Left upper body: No supraclavicular adenopathy.   Skin:     General: Skin is warm and dry.      Findings: No rash.   Neurological:      Mental Status: He is alert and oriented to person, place, and time.      Cranial Nerves: No cranial nerve deficit.      Motor: No abnormal muscle tone.      " Deep Tendon Reflexes: Reflexes normal.   Psychiatric:         Behavior: Behavior normal.      Result Review : CBC from today is pending       Assessment and Plan     *Superficial abdominal wall venous thrombosis  Patient with no prior personal nor family history of thrombosis  In September 2022 patient noticed development of a palpable venous cord extending from the area of his left nipple down to around his umbilicus, nontender.    He was seen by vascular surgery and underwent multiple imaging studies.    Ultrasound of the abdomen with hepatic Dopplers on 10/8/2022 was negative, showed hepatic vasculature patent.    CT abdomen pelvis 10/20/2022 at [facility] was negative, could not visualize any abnormality where the palpable left abdominal wall cord was present.    CT angiogram of the abdomen and pelvis on 11/16/2022 performed at [facility] was negative.    Ultimately it was felt by vascular surgery that the patient had a thrombosed superficial vein in the abdominal wall.    This gradually improved over time.  He has not received any anticoagulation.    Patient was seen by his primary care physician Dr. Nguyen on 1/3/2023 and hypercoagulable evaluation was sent on 1/16/2023 with Antithrombin %, protein C activity 179%, protein S activity 100%, negative anticardiolipin antibody panel, negative antibeta 2 GP 1 antibody panel, negative lupus anticoagulant, negative factor V Leiden mutation.  At time of initial consultation on 1/10/2023 there was a resolving vaguely palpable very small cord in the subcutaneous tissue just beneath the left pectoral muscle/breast extending into the upper abdomen.    Spontaneous improvement over time, asymptomatic status, superficial venous nature of the thrombosis thrombosis in the abdominal wall which was not evident on radiographic imaging as noted above.  No treatment was recommended at that point  Complete resolution of the previous abnormality in the abdominal  wall on exam.  No indication for anticoagulation in this situation.  The patient has no palpable abnormality on exam in the abdominal wall, no clinical evidence of recurrent thrombosis.    *Chronic borderline leukopenia  Patient has had WBC that has been in the 3-5000 range.    Labs date back to 11/15/2018 at which time he had a WBC of 5.7 with differential 55 segs, 32 lymphs, 10 monocytes.    WBC on 2/18/2020 was 4.7, on 9/15/2021 had declined to 3.7 (again with normal differential).    On 5/6/2022, WBC was 3.4 with normal differential.    Most recent labs on 12/29/2022 with WBC 3.5 and differential 47 segs, 40 lymphs, 9 monocytes, 2 eosinophils.  At time of initial consultation 1/10/2023 WBC 4.84 with normal differential.  Additional evaluation on 1/10/2023 with negative peripheral blood flow cytometry, B12 1192, folate greater than 20  Labs performed at [facility] on 3/13/2023 with WBC low normal at 4.2 with differential 46 segs, 37 lymphs, 13 monocytes.  Will not pursue any further evaluation at this point with persistent low normal WBC.    Patient returns today with labs that were performed at [facility] number of days ago however the report is not available at the time of his visit.  We will obtain this and notify him of the result by tomorrow.  Assuming his WBC is stable we discussed a follow-up visit at a 1 year interval with repeat CBC    *Borderline erythrocytosis  The patient has maintained a hematocrit in the 46-48 range.    On 12/29/2022, he did have a hemoglobin of 17.3 (above normal range) and hematocrit normal at 49.5.  At time of initial consultation 1/10/2023, normal hemoglobin 16.5 and hematocrit of 46.3.  Given the borderline elevated hematocrit on 12/29/2022 and with recent unusual site of thrombosis with abdominal wall superficial venous thrombus, elected to send off evaluation on 1/10/2023.  Erythropoietin level normal at 12.1, JAK2 [id number] and exon 12 mutation negative.    He has no  symptoms to suggest underlying obstructive sleep apnea.    Labs performed at [facility] on 3/13/2023 with hemoglobin 16.8, hematocrit normal at 48.  We will not pursue any further evaluation at this time.    Patient had labs performed at [facility] number of days ago, report not currently available.  We will notify him when result is available hopefully by tomorrow.  Assuming his hematocrit is stable we will plan repeat CBC just prior to his return visit here in 1 year.    Plan:    CBC performed at [facility] on 9/9/2023 is pending.  Will notify patient of results when available tomorrow.  MD visit in 1 year.  Just prior to visit patient will have CBC performed at [facility] (required due to insurance coverage).

## 2023-09-12 ENCOUNTER — OFFICE VISIT (OUTPATIENT)
Dept: ONCOLOGY | Facility: CLINIC | Age: 53
End: 2023-09-12
Payer: COMMERCIAL

## 2023-09-12 VITALS
OXYGEN SATURATION: 99 % | WEIGHT: 148.3 LBS | HEART RATE: 71 BPM | RESPIRATION RATE: 18 BRPM | SYSTOLIC BLOOD PRESSURE: 133 MMHG | HEIGHT: 71 IN | BODY MASS INDEX: 20.76 KG/M2 | DIASTOLIC BLOOD PRESSURE: 87 MMHG | TEMPERATURE: 98.6 F

## 2023-09-12 DIAGNOSIS — D75.1 ERYTHROCYTOSIS: Primary | ICD-10-CM

## 2023-09-12 PROCEDURE — 99213 OFFICE O/P EST LOW 20 MIN: CPT | Performed by: INTERNAL MEDICINE

## 2023-09-15 ENCOUNTER — TELEPHONE (OUTPATIENT)
Dept: ONCOLOGY | Facility: CLINIC | Age: 53
End: 2023-09-15
Payer: COMMERCIAL

## 2023-09-15 DIAGNOSIS — D75.1 ERYTHROCYTOSIS: Primary | ICD-10-CM

## 2023-09-15 NOTE — TELEPHONE ENCOUNTER
Left message for patient informing him that his WBC was slightly low, and his hematocrit was slightly elevated. Per Dr. Guerra, patient needs to return in 6 months for repeat CBC and RN review, and will keep follow up visit in 1 year as scheduled. Callback number provided in case of questions.

## 2023-12-07 ENCOUNTER — TELEPHONE (OUTPATIENT)
Dept: ONCOLOGY | Facility: CLINIC | Age: 53
End: 2023-12-07
Payer: COMMERCIAL

## 2023-12-07 NOTE — TELEPHONE ENCOUNTER
Called the patient to go over his lab results from September and I went over the results and he asked why he was scheduled for an infusion in march and I let him know he was not and this was just an RN review and he stated he saw the dept and got confused. He wanted to know about diet and I let him know regarding his labs that Carine HIGGINBOTHAM also went over in Sept that there was nothing we would have him change at this time and he v/u.

## 2023-12-07 NOTE — TELEPHONE ENCOUNTER
Provider: Charlie  Caller: patient  Relationship to Patient: self  Call Back Phone Number: [phone]  Reason for Call: patient wants to talk about his lab results

## 2024-03-04 ENCOUNTER — TELEPHONE (OUTPATIENT)
Dept: ONCOLOGY | Facility: CLINIC | Age: 54
End: 2024-03-04
Payer: COMMERCIAL

## 2024-03-04 NOTE — TELEPHONE ENCOUNTER
Provider: Charlie Uptoner: patient  Relationship to Patient: self  Call Back Phone Number: [phone]  Reason for Call: patient wants to know if he still needs to keep his lab appointment because he had labs done somewhere else

## 2024-03-04 NOTE — TELEPHONE ENCOUNTER
Called the patient back and he states he had a cbc done at [facility] for his pcp on 2/5 and he would like for us to use these. I let him know I would try to get them and he v/u.

## 2024-03-06 ENCOUNTER — TELEPHONE (OUTPATIENT)
Dept: ONCOLOGY | Facility: CLINIC | Age: 54
End: 2024-03-06
Payer: COMMERCIAL

## 2024-03-06 NOTE — TELEPHONE ENCOUNTER
Called the patient to let him know we did not need the 3/13 lab and rn review appt. He was appreciative and v/u.

## 2024-08-08 ENCOUNTER — TELEPHONE (OUTPATIENT)
Dept: ONCOLOGY | Facility: CLINIC | Age: 54
End: 2024-08-08
Payer: COMMERCIAL

## 2024-08-08 NOTE — TELEPHONE ENCOUNTER
Provider: Charlie  Caller: patient   Relationship to Patient: self  Call Back Phone Number: [phone]  Reason for Call: Pt calling about needing an lab order sent to [facility].

## 2024-08-08 NOTE — TELEPHONE ENCOUNTER
Called patient, let him know the order for CBC has been fax to "[facility] off [facility] @ [phone]. Patient did not answer, left VM.

## 2024-09-06 ENCOUNTER — TELEPHONE (OUTPATIENT)
Dept: ONCOLOGY | Facility: CLINIC | Age: 54
End: 2024-09-06
Payer: COMMERCIAL

## 2024-09-06 NOTE — TELEPHONE ENCOUNTER
Caller: Андрей Hernandez    Relationship: Self    Best call back number: [phone]    Who are you requesting to speak with (clinical staff, provider,  specific staff member): SCHEDULING        What was the call regarding: PT WANTING TO CANCEL HIS 10/2 APPT.

## 2025-02-17 ENCOUNTER — TELEPHONE (OUTPATIENT)
Dept: ONCOLOGY | Facility: CLINIC | Age: 55
End: 2025-02-17
Payer: COMMERCIAL

## 2025-02-17 NOTE — TELEPHONE ENCOUNTER
Caller: Андрей Hernandez    Relationship: Self    Best call back number: [phone]     Who are you requesting to speak with (clinical staff, provider,  specific staff member): CLINICAL      What was the call regarding: PATIENT REQUESTING LAB ORDERS TODAY TO [facility] ON [facility]

## 2025-02-17 NOTE — TELEPHONE ENCOUNTER
I called the patient, asked if he is going to continue to f/u with Dr. Guerra the patient said no, that Dr. Guerra wanted him to get blood work done though. I relayed Dr. Guerra's message to the patient, pt v/u and is not planning to f/u with Dr. Guerra. No labs were sent to [facility].

## 2025-02-17 NOTE — TELEPHONE ENCOUNTER
Provider: Dr. Guerra  Caller: Андрей Hernandez  Relationship to Patient: Self  Call Back Phone Number: [phone]  Reason for Call: Pt is requesting lab orders to go to [facility] due to insurance requirements. Please call pt to verify.